# Patient Record
Sex: MALE | Race: WHITE | HISPANIC OR LATINO | Employment: FULL TIME | URBAN - METROPOLITAN AREA
[De-identification: names, ages, dates, MRNs, and addresses within clinical notes are randomized per-mention and may not be internally consistent; named-entity substitution may affect disease eponyms.]

---

## 2017-12-30 ENCOUNTER — GENERIC CONVERSION - ENCOUNTER (OUTPATIENT)
Dept: OTHER | Facility: OTHER | Age: 37
End: 2017-12-30

## 2018-01-12 ENCOUNTER — ALLSCRIPTS OFFICE VISIT (OUTPATIENT)
Dept: OTHER | Facility: OTHER | Age: 38
End: 2018-01-12

## 2018-01-13 NOTE — PROGRESS NOTES
Assessment   1  Obstructive sleep apnea syndrome (327 23) (G47 33)   2  Abdominal bloating (787 3) (R14 0)    Plan   Abdominal bloating    · (1) CELIAC DISEASE AB PROFILE; Status:Active; Requested ALBA:[id number];    · (1) IBD SEROLOGY 7; Status:Active; Requested DCC:[id number];    · ([facility] [facility]) H  PYLORI,IGG/IGA ABS; Status:Active; Requested NYQ:[id number];    · 1 - Anupam Woody DO (Pulmonary Disease) Co-Management  *  Status: Hold For -    Scheduling  Requested for: 90ALJ0749  Care Summary provided  : Yes  Obstructive sleep apnea syndrome    · Follow-up visit in 3 months Evaluation and Treatment  Follow-up  Status: Hold For -    Scheduling  Requested for: 90RVC5010   · Call [phone] if: The symptoms are not better in 1 month ; Status:Complete;      Done: 66KPL8489 03:35PM   · Seek Immediate Medical Attention if: You feel short of breath even while resting ;    Status:Complete;   Done: 89HVJ9712 03:35PM   · Seek Immediate Medical Attention if: Your child has difficulty breathing ; Status:Complete;      Done: 28LSL6467 03:35PM    Chief Complaint   Chief Complaint Free Text Note Form: Patient presents to establish PCP (dli)      History of Present Illness   Abdominal Pain (Non-Malignant) (Brief): The patient is being seen for an initial evaluation of non-malignancy related abdominal pain  Symptoms:  Bloating  The patient is currently asymptomatic  No associated symptoms are reported  Current treatment includes H2 blockers  By report there is good compliance with treatment  Obstructive Sleep Apnea (Brief): The patient is being seen for an initial evaluation of obstructive sleep apnea  Symptoms:  excessive daytime sleepiness,-- snoring-- and-- unrefreshing sleep  The patient is currently asymptomatic  No associated symptoms are reported  The patient is not currently being treated for this problem   Pertinent medical history:  no obesity,-- no asthma,-- no chronic obstructive pulmonary disease,-- no congestive heart failure,-- no environmental allergies,-- no hypertension,-- no pulmonary hypertension,-- no cardiovascular disease,-- no cerebrovascular disease,-- no polycythemia,-- no chronic hypoxemia,-- no chronic hypercapnia,-- no cardiac arrhythmia,-- no gastroesophageal reflux disease-- and-- no alcohol abuse  Review of Systems   Complete-Male:      Constitutional: No fever or chills, feels well, no tiredness, no recent weight gain or weight loss  ENT: no complaints of earache, no hearing loss, no nosebleeds, no nasal discharge, no sore throat, no hoarseness  Cardiovascular: No complaints of slow heart rate, no fast heart rate, no chest pain, no palpitations, no leg claudication, no lower extremity  Respiratory: No complaints of shortness of breath, no wheezing, no cough, no SOB on exertion, no orthopnea or PND  Gastrointestinal: as noted in HPI  Active Problems   1  Acute sinusitis (461 9) (J01 90)   2  GERD without esophagitis (530 81) (K21 9)   3  Snoring (786 09) (R06 83)    Past Medical History   Active Problems And Past Medical History Reviewed: The active problems and past medical history were reviewed and updated today  Surgical History   1  History of Eye Surgery  Surgical History Reviewed: The surgical history was reviewed and updated today  Family History   Mother    1  No pertinent family history  Father    2  Family history of colon cancer (V16 0) (Z80 0)  Family History Reviewed: The family history was reviewed and updated today  Social History    · Never a smoker   · History of Never a smoker   · No illicit drug use   · Social alcohol use (Z78 9)  Social History Reviewed: The social history was reviewed and updated today  Current Meds    1  Prevacid 15 MG Oral Capsule Delayed Release; Therapy: (Recorded:43Ean8594) to Recorded    Allergies   1   No Known Drug Allergies    Vitals   Vital Signs    Recorded: 09CCV6659 03:10PM   Temperature 98 7 F Heart Rate 68   Respiration 18   Systolic 467   Diastolic 60   Height 5 ft 8 in   Weight 234 lb    BMI Calculated 35 58   BSA Calculated 2 18   O2 Saturation 96     Physical Exam        Constitutional      General appearance: No acute distress, well appearing and well nourished  Pulmonary      Respiratory effort: No increased work of breathing or signs of respiratory distress  Auscultation of lungs: Clear to auscultation, equal breath sounds bilaterally, no wheezes, no rales, no rhonci  Cardiovascular      Auscultation of heart: Normal rate and rhythm, normal S1 and S2, without murmurs  Abdomen      Abdomen: Non-tender, no masses  Liver and spleen: No hepatomegaly or splenomegaly  Results/Data   PHQ-2 Adult Depression Screening 12Jan2018 03:14PM User, s      Test Name Result Flag Reference   PHQ-2 Adult Depression Score 0     Over the last two weeks, how often have you been bothered by any of the following problems?      Little interest or pleasure in doing things: Not at all - 0     Feeling down, depressed, or hopeless: Not at all - 0   PHQ-2 Adult Depression Screening Negative          Signatures    Electronically signed by : Monica Cantu MD; Jan 12 2018  3:37PM EST                       (Author)

## 2018-01-23 VITALS
HEART RATE: 68 BPM | WEIGHT: 234 LBS | OXYGEN SATURATION: 96 % | BODY MASS INDEX: 35.46 KG/M2 | TEMPERATURE: 98.7 F | RESPIRATION RATE: 18 BRPM | DIASTOLIC BLOOD PRESSURE: 60 MMHG | SYSTOLIC BLOOD PRESSURE: 120 MMHG | HEIGHT: 68 IN

## 2018-01-24 VITALS
OXYGEN SATURATION: 96 % | HEART RATE: 72 BPM | RESPIRATION RATE: 18 BRPM | SYSTOLIC BLOOD PRESSURE: 118 MMHG | TEMPERATURE: 97.6 F | DIASTOLIC BLOOD PRESSURE: 78 MMHG | WEIGHT: 240.8 LBS

## 2018-01-24 VITALS — WEIGHT: 240.8 LBS | HEIGHT: 68 IN | BODY MASS INDEX: 36.49 KG/M2

## 2018-04-17 ENCOUNTER — OUTPATIENT (OUTPATIENT)
Dept: OUTPATIENT SERVICES | Facility: HOSPITAL | Age: 38
LOS: 1 days | Discharge: HOME | End: 2018-04-17

## 2018-04-17 DIAGNOSIS — R35.1 NOCTURIA: ICD-10-CM

## 2018-04-17 DIAGNOSIS — E29.1 TESTICULAR HYPOFUNCTION: ICD-10-CM

## 2018-07-03 ENCOUNTER — OUTPATIENT (OUTPATIENT)
Dept: OUTPATIENT SERVICES | Facility: HOSPITAL | Age: 38
LOS: 1 days | Discharge: HOME | End: 2018-07-03

## 2018-07-04 DIAGNOSIS — R35.1 NOCTURIA: ICD-10-CM

## 2018-07-04 DIAGNOSIS — E29.1 TESTICULAR HYPOFUNCTION: ICD-10-CM

## 2019-02-26 ENCOUNTER — OFFICE VISIT (OUTPATIENT)
Dept: URGENT CARE | Facility: CLINIC | Age: 39
End: 2019-02-26
Payer: COMMERCIAL

## 2019-02-26 VITALS
DIASTOLIC BLOOD PRESSURE: 90 MMHG | SYSTOLIC BLOOD PRESSURE: 130 MMHG | RESPIRATION RATE: 16 BRPM | OXYGEN SATURATION: 98 % | TEMPERATURE: 97.7 F | WEIGHT: 245.5 LBS | BODY MASS INDEX: 37.21 KG/M2 | HEIGHT: 68 IN | HEART RATE: 84 BPM

## 2019-02-26 DIAGNOSIS — J06.9 ACUTE UPPER RESPIRATORY INFECTION: Primary | ICD-10-CM

## 2019-02-26 PROCEDURE — 99213 OFFICE O/P EST LOW 20 MIN: CPT | Performed by: PHYSICIAN ASSISTANT

## 2019-02-26 RX ORDER — FLUTICASONE PROPIONATE 50 MCG
1 SPRAY, SUSPENSION (ML) NASAL DAILY
Qty: 1 BOTTLE | Refills: 0 | Status: SHIPPED | OUTPATIENT
Start: 2019-02-26

## 2019-02-26 RX ORDER — BENZONATATE 100 MG/1
100 CAPSULE ORAL 3 TIMES DAILY PRN
Qty: 30 CAPSULE | Refills: 0 | Status: SHIPPED | OUTPATIENT
Start: 2019-02-26

## 2019-02-26 RX ORDER — LANSOPRAZOLE 15 MG/1
CAPSULE, DELAYED RELEASE ORAL
COMMUNITY

## 2019-02-26 NOTE — PATIENT INSTRUCTIONS

## 2019-02-26 NOTE — PROGRESS NOTES
[address] [employer] [facility]        NAME: Rufina Vanegas is a 43 y o  male  : 1980    MRN: [id number]  DATE: 2019  TIME: 9:02 AM    Assessment and Plan   Acute upper respiratory infection [J06 9]  1  Acute upper respiratory infection  fluticasone (FLONASE) 50 mcg/act nasal spray    benzonatate (TESSALON PERLES) 100 mg capsule         Patient Instructions   Acute Upper Respiratory Tract Infection:   -There is no sign of bacterial infection on exam at this time  The patients lungs are CTABL  His pulse ox is 98%  This is likely a viral illness  Advised supportive measures  The patients cough is likely secondary PND    -Fluticasone Nasal Spray for PND and congestion  Tessalon perles for the cough  -You can use OTC zyrtec or claritin  You can OTC mucinex  -Use a humidifier next to your bed  Take steam showers  -You can take Advil or Tylenol for fever or pain  -Stay very well hydrated and rest   -If your symptoms persist or worsen follow up immediately with your PCP    Follow up with PCP in 3-5 days  Proceed to  ER if symptoms worsen  Chief Complaint     Chief Complaint   Patient presents with    Cough     Pt here ill x2 weeks pt states cough,  post nasal drip, pressure in sinus,  no fever  Pt used Mucinex  and Sudafed  History of Present Illness       The patient presents today for a two week hx of cough, PND and bilateral sinus pressure  The patient states that his symptoms began as rhinorrhea and PND that progressed to nasal congestion and sinus pressure  He states that approximately 10 days ago he began to have a dry cough that has persisted and has remained stable  He denies fever or chills, headache, dyspnea, wheezing, chest tightness, cp, palpitations, dizziness  He has been taking mucinex and sudafed for his symptoms with moderate relief  He denies hx of smoking or asthma  He denies sick contacts or recent travel         Review of Systems   Review of Systems   Constitutional: Negative for activity change, appetite change, chills, diaphoresis, fatigue and fever  HENT: Positive for congestion, postnasal drip, rhinorrhea and sinus pressure  Negative for ear discharge, ear pain, facial swelling, hearing loss, sinus pain, sneezing, sore throat, tinnitus, trouble swallowing and voice change  Respiratory: Positive for cough  Negative for chest tightness, shortness of breath, wheezing and stridor  Cardiovascular: Negative for chest pain, palpitations and leg swelling  Gastrointestinal: Negative for abdominal pain, diarrhea, nausea and vomiting  Musculoskeletal: Negative for arthralgias, joint swelling, myalgias, neck pain and neck stiffness  Skin: Negative for rash  Allergic/Immunologic: Negative for immunocompromised state  Neurological: Negative for dizziness, weakness, light-headedness, numbness and headaches  Hematological: Negative for adenopathy  Current Medications       Current Outpatient Medications:     benzonatate (TESSALON PERLES) 100 mg capsule, Take 1 capsule (100 mg total) by mouth 3 (three) times a day as needed for cough, Disp: 30 capsule, Rfl: 0    fluticasone (FLONASE) 50 mcg/act nasal spray, 1 spray into each nostril daily, Disp: 1 Bottle, Rfl: 0    lansoprazole (PREVACID) 15 mg capsule, Take by mouth, Disp: , Rfl:     Current Allergies     Allergies as of 02/26/2019    (No Known Allergies)            The following portions of the patient's history were reviewed and updated as appropriate: allergies, current medications, past family history, past medical history, past social history, past surgical history and problem list      Past Medical History:   Diagnosis Date    Patient denies medical problems        Past Surgical History:   Procedure Laterality Date    NO PAST SURGERIES         Family History   Problem Relation Age of Onset    Parkinsonism Mother     Cancer Father          Medications have been verified          Objective   /90 (BP Location: Right arm, Patient Position: Sitting, Cuff Size: Large)   Pulse 84   Temp 97 7 °F (36 5 °C) (Tympanic)   Resp 16   Ht 5' 8" (1 727 m)   Wt 111 kg (245 lb 8 oz)   SpO2 98%   BMI 37 33 kg/m²        Physical Exam     Physical Exam   Constitutional: He is oriented to person, place, and time  He appears well-developed and well-nourished  No distress  HENT:   Head: Normocephalic and atraumatic  Right Ear: Hearing, tympanic membrane, external ear and ear canal normal    Left Ear: Hearing, tympanic membrane, external ear and ear canal normal    Nose: Nose normal  No mucosal edema or rhinorrhea  Right sinus exhibits no maxillary sinus tenderness and no frontal sinus tenderness  Left sinus exhibits no maxillary sinus tenderness and no frontal sinus tenderness  Mouth/Throat: Uvula is midline, oropharynx is clear and moist and mucous membranes are normal  No uvula swelling  No oropharyngeal exudate, posterior oropharyngeal edema, posterior oropharyngeal erythema or tonsillar abscesses  Neck: Normal range of motion  Neck supple  Cardiovascular: Normal rate, regular rhythm, S1 normal and S2 normal  Exam reveals no gallop, no S3, no S4, no distant heart sounds and no friction rub  No murmur heard  Pulmonary/Chest: No accessory muscle usage  No tachypnea and no bradypnea  No respiratory distress  He has no decreased breath sounds  He has no wheezes  He has no rhonchi  He has no rales  Lymphadenopathy:     He has no cervical adenopathy  Neurological: He is alert and oriented to person, place, and time  Skin: He is not diaphoretic  Psychiatric: He has a normal mood and affect  His behavior is normal    Nursing note and vitals reviewed

## 2021-03-09 ENCOUNTER — TELEPHONE (OUTPATIENT)
Dept: PULMONOLOGY | Facility: MEDICAL CENTER | Age: 41
End: 2021-03-09

## 2021-03-09 NOTE — TELEPHONE ENCOUNTER
Called all available john montes to reach the patient  He is not a patient of ours so he would have to schedule first available new patient appt, which looks to be on April 13  Not sure what he means requesting a "rapid" covid test-  We do not perform those in the office      Needs to be seen to have an order put in

## 2021-04-23 ENCOUNTER — TELEPHONE (OUTPATIENT)
Dept: FAMILY MEDICINE CLINIC | Facility: CLINIC | Age: 41
End: 2021-04-23

## 2021-04-23 NOTE — TELEPHONE ENCOUNTER
Last seen 2018    HOME: [phone] ([employer] [address] phone# - No Message left    Called Mobile# [phone] - no  set up      Home# [phone] not a valid#,